# Patient Record
Sex: FEMALE | Race: WHITE
[De-identification: names, ages, dates, MRNs, and addresses within clinical notes are randomized per-mention and may not be internally consistent; named-entity substitution may affect disease eponyms.]

---

## 2018-04-20 ENCOUNTER — HOSPITAL ENCOUNTER (OUTPATIENT)
Dept: HOSPITAL 76 - DI | Age: 53
Discharge: HOME | End: 2018-04-20
Attending: INTERNAL MEDICINE
Payer: OTHER GOVERNMENT

## 2018-04-20 ENCOUNTER — HOSPITAL ENCOUNTER (OUTPATIENT)
Dept: HOSPITAL 76 - DI.N | Age: 53
Discharge: HOME | End: 2018-04-20
Attending: INTERNAL MEDICINE
Payer: OTHER GOVERNMENT

## 2018-04-20 DIAGNOSIS — Z12.31: Primary | ICD-10-CM

## 2018-04-20 DIAGNOSIS — Z78.0: ICD-10-CM

## 2018-04-20 DIAGNOSIS — Z13.820: Primary | ICD-10-CM

## 2018-04-20 PROCEDURE — 77067 SCR MAMMO BI INCL CAD: CPT

## 2018-04-20 PROCEDURE — 77080 DXA BONE DENSITY AXIAL: CPT

## 2018-04-23 NOTE — MAMMOGRAPHY REPORT
DIGITAL SCREENING MAMMOGRAM:  04/20/2018

 

CLINICAL INDICATION:  A 52-year-old for screening.

 

COMPARISON:  05/2016, 04/2015, 11/2012, 11/2010.

 

TECHNIQUE:  Routine CC and MLO projections were obtained of the breasts.

 

FINDINGS:  The breasts demonstrate scattered fibroglandular densities bilaterally.  Coarse and 

punctate, typically benign calcifications are present.  No suspicious masses, clustered 

microcalcifications, or regions of architectural distortion are identified.

 

IMPRESSION:  BENIGN FINDINGS.

 

RECOMMENDATION:  Routine annual screening unless otherwise clinically indicated.

 

BIRADS CATEGORY 2 - BENIGN FINDING.

 

STANDARD QUALIFYING STATEMENTS:

1.  This examination was reviewed with the aid of Computer-Aided Detection (CAD).

2.  A negative or benign imaging report should not delay biopsy if clinically suspicious 

findings are present.  Consider surgical consultation if warranted.  More than 5% of cancers 

are not identified by imaging.

3.  Dense breasts may obscure an underlying neoplasm.

 

 

DD: 04/23/2018 13:19

TD: 04/23/2018 13:58

Job #: [id number]

## 2018-04-23 NOTE — DEXA REPORT
DEXA SCAN:  04/20/2018

 

CLINICAL INDICATION:  Postmenopausal.

 

TECHNIQUE:  Dual energy x-ray absorptiometry (DXA) was performed on a  [employer]
  system.  

Regions measured are the AP spine, femoral neck, and, if needed, forearm.

 

COMPARISON:  None. In accordance with the International Society for Clinical 
Densitometry 

(ISCD) guidelines, data from previous exams may be reanalyzed using current 
recommendations and

techniques.  This is done to allow a more accurate basis for comparison with 
the current study.

 

FINDINGS

Data for the lumbar spine is as follows:





REGION BMD (g/cm/cm) T-SCORE Z-SCORE

 

L1 1.036 -0.8 -0.2

 

L2 1.184 -0.1 0.5

 

L3 1.286 0.7 1.3

 

L4 1.288 0.7 1.3

 

L1-L4 1.205 0.2 0.8

 

   





NOTE:  All evaluable vertebrae are used for classification.

 

 

The data for the hip is as follows:





REGION BMD (g/cm/cm) T-SCORE Z-SCORE

 

Neck 0.965 -0.5 0.4

 

TOTAL 1.009 0.0 0.5





NOTE:  The femoral neck or total proximal femur, whichever is lowest, is used 
for classification.

 

 

IMPRESSION

WHO CLASSIFICATION BASED ON THE INTERNATIONAL REFERENCE STANDARD IS NORMAL.

FRACTURE RISK IS NOT INCREASED.

 

RECOMMENDATION:  Patients with diagnosis of osteoporosis or osteopenia should 
have regular bone

mineral density assessment. For those eligible for Medicare, routine testing is 
allowed once 

every 2 years. Testing frequency can be increased for patients who have rapidly 
progressing 

disease or for those who are receiving medical therapy to restore bone mass.

 

COMMENT

World Health Organization (WHO) definitions for osteoporosis and osteopenia:

NORMAL BMD:  T-score at 1.0 or higher, fracture risk is low.

OSTEOPENIA BMD:  T-score between 1.0 and -2.5, fracture risk is increased.

OSTEOPOROSIS BMD:  T-score at 2.5 or lower, fracture risk high.

 

National Osteoporosis Foundation recommends:

1. Obtain adequate dietary calcium (at least 1200 mg per day) and vitamin D (400
-800 international units per day).

2. Participate, as appropriate, in regular weightbearing and muscle-
strengthening exercise.

3. Avoid tobacco use and reduce alcohol and caffeine intake.

4. For more detailed information see the website at www.NOF.org.

 

 

DD: 04/20/2018 11:19

TD: 04/20/2018 15:27

Job #: [id number]

ANAIS

## 2021-05-13 ENCOUNTER — HOSPITAL ENCOUNTER (OUTPATIENT)
Dept: HOSPITAL 76 - DI | Age: 56
Discharge: HOME | End: 2021-05-13
Attending: INTERNAL MEDICINE
Payer: OTHER GOVERNMENT

## 2021-05-13 DIAGNOSIS — Z12.31: Primary | ICD-10-CM

## 2021-05-14 NOTE — MAMMOGRAPHY REPORT
BILATERAL DIGITAL SCREENING MAMMOGRAM 3D/2D: 5/13/2021

 

CLINICAL: Routine screening.  

 

Comparison is made to exams dated:  4/20/2018 mammogram, 5/4/2016 mammogram, 4/13/2015 mammogram, 11/
19/2012 mammogram, and 11/3/2010 mammogram - [facility].  The tissue of both breast
s is heterogeneously dense. This may lower the sensitivity of mammography.  

 

There are benign vascular calcifications in the left breast.  

No significant masses, calcifications, or other findings are seen in either breast.  

There has been no significant interval change.

 

IMPRESSION: BENIGN

There is no mammographic evidence of malignancy. A 1 year screening mammogram is recommended.  

 

 

This exam was interpreted at Station ID: [id number].  

 

NOTE: For mammograms, a report in lay terms will be sent to the patient. Approximately 15% of breast 
malignancies will not be visualized mammographically. In the management of a palpable breast mass, a 
negative mammogram must not discourage biopsy of a clinically suspicious lesion.

 

Electronically Signed By: Tigre Tanner M.D.          

ddp/penrad:5/13/2021 11:07:12  

 

 

 

ACR BI-RADS Category 2: Benign Finding(s) 3342F

PARENCHYMAL PATTERN: (D) - The breast(s) demonstrate(s) heterogeneously dense fibroglandular shawanda fields.

BI-RADS CATEGORY: (2) - 2

RECOMMENDATION: (ANNUAL)  - Recommend routine annual screening mammography.

[id number]

1 year screening

LATERALITY: (B)

## 2023-01-20 ENCOUNTER — HOSPITAL ENCOUNTER (OUTPATIENT)
Dept: HOSPITAL 76 - DI | Age: 58
Discharge: HOME | End: 2023-01-20
Attending: GENERAL ACUTE CARE HOSPITAL
Payer: COMMERCIAL

## 2023-01-20 DIAGNOSIS — Z12.31: Primary | ICD-10-CM

## 2023-01-23 NOTE — MAMMOGRAPHY REPORT
BILATERAL DIGITAL SCREENING MAMMOGRAM 3D/2D: 1/20/2023

 

CLINICAL: Routine screening.  

 

Comparison is made to exams dated:  5/13/2021 mammogram, 4/20/2018 mammogram, 5/4/2016 mammogram, and
 4/13/2015 mammogram - [facility].  

 

Both breasts are heterogeneously dense, which may obscure small masses (category c / 51-75% glandular
 tissue).  

 

There are benign vascular calcifications in the left breast.  

No significant masses, calcifications, or other findings are seen in either breast.  

There has been no significant interval change.

 

IMPRESSION: BENIGN

There is no mammographic evidence of malignancy. A 1 year screening mammogram is recommended.  

 

Based on the Tyrer Cuzick model (a risk assessment model) the patients lifetime risk is 9.8% and her
 10 year risk is 3.8%. According to the ACR, ACS, and NCCN guidelines, an annual breast MRI exam scar
g with mammogram is recommended if the patients lifetime risk is 20% or greater.

 

 

This exam was interpreted at Station ID: [id number].  

 

NOTE: For mammograms, a report in lay terms will be sent to the patient. Approximately 15% of breast 
malignancies will not be visualized mammographically. In the management of a palpable breast mass, a 
negative mammogram must not discourage biopsy of a clinically suspicious lesion.

 

Electronically Signed By: Darius Butler M.D., jr/damián:1/20/2023 15:10:02  

 

 

 

ACR BI-RADS Category 2: Benign Finding(s) 3342F

PARENCHYMAL PATTERN: (D) - The breast(s) demonstrate(s) heterogeneously dense fibroglandular shawanda fields.

BI-RADS CATEGORY: (2) - 2

RECOMMENDATION: (ANNUAL)  - Recommend routine annual screening mammography.

[id number]

1 year screening

LATERALITY: (B)